# Patient Record
Sex: MALE | Race: WHITE | NOT HISPANIC OR LATINO | Employment: OTHER | URBAN - METROPOLITAN AREA
[De-identification: names, ages, dates, MRNs, and addresses within clinical notes are randomized per-mention and may not be internally consistent; named-entity substitution may affect disease eponyms.]

---

## 2024-03-30 ENCOUNTER — OFFICE VISIT (OUTPATIENT)
Dept: URGENT CARE | Facility: URGENT CARE | Age: 44
End: 2024-03-30

## 2024-03-30 VITALS
OXYGEN SATURATION: 97 % | DIASTOLIC BLOOD PRESSURE: 77 MMHG | TEMPERATURE: 98 F | HEART RATE: 77 BPM | WEIGHT: 307 LBS | SYSTOLIC BLOOD PRESSURE: 120 MMHG | RESPIRATION RATE: 22 BRPM

## 2024-03-30 DIAGNOSIS — H10.9 BACTERIAL CONJUNCTIVITIS OF LEFT EYE: Primary | ICD-10-CM

## 2024-03-30 PROCEDURE — 99203 OFFICE O/P NEW LOW 30 MIN: CPT | Performed by: NURSE PRACTITIONER

## 2024-03-30 RX ORDER — POLYMYXIN B SULFATE AND TRIMETHOPRIM 1; 10000 MG/ML; [USP'U]/ML
1-2 SOLUTION OPHTHALMIC EVERY 4 HOURS
Qty: 10 ML | Refills: 0 | Status: SHIPPED | OUTPATIENT
Start: 2024-03-30

## 2024-03-30 NOTE — PATIENT INSTRUCTIONS
Polytrim every 4 hours during the day.    Lots of handwashing.    Follow up if persists or worsens.

## 2024-03-30 NOTE — PROGRESS NOTES
Assessment & Plan     Bacterial conjunctivitis of left eye  - polymixin b-trimethoprim (POLYTRIM) 81751-3.1 UNIT/ML-% ophthalmic solution  Dispense: 10 mL; Refill: 0     Patient Instructions   Polytrim every 4 hours during the day.    Lots of handwashing.    Follow up if persists or worsens.          Return in about 1 week (around 4/6/2024).    JEFF Muniz CNP  [facility] ANNIKA Gunter is a 43 year old male who presents to clinic today for the following health issues:  Chief Complaint   Patient presents with    Eye Problem     Left eye pain,irritated and some swelling since yesterday. Was driving from [place] yesterday     HPI    Eye Problem    Onset of symptoms was 1 day(s) ago.   Location: left eye   Course of illness is waxing and waning.    Severity moderate  Current and Associated symptoms: discharge, mattering, pain, burning, redness  Treatment measures tried include flushed with water   Context: Possible foreign body      Review of Systems  Constitutional, HEENT, cardiovascular, pulmonary, gi and gu systems are negative, except as otherwise noted.      Objective    /77   Pulse 77   Temp 98  F (36.7  C)   Resp 22   Wt 139.3 kg (307 lb)   SpO2 97%   Physical Exam   GENERAL: alert and no distress  EYES: conjunctiva/corneas- conjunctival injection left and yellow colored discharge present left  RESP: lungs clear to auscultation - no rales, rhonchi or wheezes  CV: regular rate and rhythm, normal S1 S2, no S3 or S4, no murmur, click or rub, no peripheral edema  MS: no gross musculoskeletal defects noted, no edema